# Patient Record
Sex: FEMALE | Race: WHITE
[De-identification: names, ages, dates, MRNs, and addresses within clinical notes are randomized per-mention and may not be internally consistent; named-entity substitution may affect disease eponyms.]

---

## 2019-06-24 ENCOUNTER — HOSPITAL ENCOUNTER (OUTPATIENT)
Dept: HOSPITAL 47 - OR | Age: 44
Discharge: HOME | End: 2019-06-24
Attending: INTERNAL MEDICINE
Payer: COMMERCIAL

## 2019-06-24 VITALS — RESPIRATION RATE: 18 BRPM | TEMPERATURE: 97.1 F

## 2019-06-24 VITALS — SYSTOLIC BLOOD PRESSURE: 143 MMHG | HEART RATE: 64 BPM | DIASTOLIC BLOOD PRESSURE: 85 MMHG

## 2019-06-24 VITALS — BODY MASS INDEX: 33 KG/M2

## 2019-06-24 DIAGNOSIS — Z80.1: ICD-10-CM

## 2019-06-24 DIAGNOSIS — E11.9: ICD-10-CM

## 2019-06-24 DIAGNOSIS — J45.909: ICD-10-CM

## 2019-06-24 DIAGNOSIS — Q99.1: Primary | ICD-10-CM

## 2019-06-24 DIAGNOSIS — Z79.84: ICD-10-CM

## 2019-06-24 DIAGNOSIS — Z88.6: ICD-10-CM

## 2019-06-24 DIAGNOSIS — Z87.891: ICD-10-CM

## 2019-06-24 DIAGNOSIS — Z88.5: ICD-10-CM

## 2019-06-24 DIAGNOSIS — Z80.42: ICD-10-CM

## 2019-06-24 DIAGNOSIS — M19.90: ICD-10-CM

## 2019-06-24 DIAGNOSIS — Z80.41: ICD-10-CM

## 2019-06-24 DIAGNOSIS — Z80.3: ICD-10-CM

## 2019-06-24 DIAGNOSIS — Z83.2: ICD-10-CM

## 2019-06-24 DIAGNOSIS — K21.9: ICD-10-CM

## 2019-06-24 DIAGNOSIS — Z88.0: ICD-10-CM

## 2019-06-24 DIAGNOSIS — Z79.899: ICD-10-CM

## 2019-06-24 DIAGNOSIS — I10: ICD-10-CM

## 2019-06-24 DIAGNOSIS — E78.5: ICD-10-CM

## 2019-06-24 LAB
BASOPHILS # BLD AUTO: 0.1 K/UL (ref 0–0.2)
BASOPHILS NFR BLD AUTO: 1 %
EOSINOPHIL # BLD AUTO: 0.3 K/UL (ref 0–0.7)
EOSINOPHIL NFR BLD AUTO: 3 %
ERYTHROCYTE [DISTWIDTH] IN BLOOD BY AUTOMATED COUNT: 4.14 M/UL (ref 3.8–5.4)
ERYTHROCYTE [DISTWIDTH] IN BLOOD: 14.4 % (ref 11.5–15.5)
GLUCOSE BLD-MCNC: 117 MG/DL (ref 75–99)
HCT VFR BLD AUTO: 36.2 % (ref 34–46)
HGB BLD-MCNC: 12 GM/DL (ref 11.4–16)
LYMPHOCYTES # SPEC AUTO: 3.1 K/UL (ref 1–4.8)
LYMPHOCYTES NFR SPEC AUTO: 26 %
MCH RBC QN AUTO: 29.1 PG (ref 25–35)
MCHC RBC AUTO-ENTMCNC: 33.3 G/DL (ref 31–37)
MCV RBC AUTO: 87.4 FL (ref 80–100)
MONOCYTES # BLD AUTO: 0.7 K/UL (ref 0–1)
MONOCYTES NFR BLD AUTO: 6 %
NEUTROPHILS # BLD AUTO: 7.4 K/UL (ref 1.3–7.7)
NEUTROPHILS NFR BLD AUTO: 62 %
PLATELET # BLD AUTO: 378 K/UL (ref 150–450)
WBC # BLD AUTO: 11.8 K/UL (ref 3.8–10.6)

## 2019-06-24 PROCEDURE — 81025 URINE PREGNANCY TEST: CPT

## 2019-06-24 PROCEDURE — 85025 COMPLETE CBC W/AUTO DIFF WBC: CPT

## 2019-06-24 PROCEDURE — 38222 DX BONE MARROW BX & ASPIR: CPT

## 2019-06-24 NOTE — PCN
PROCEDURE NOTE



PROCEDURE:

Bone marrow aspirate and biopsy.



INDICATION:

Leukocytosis.



After obtaining consent from the patient, the procedure was performed in the endoscopy

suite under general anesthesia performed by Anesthesia Team.  The patient was put in

the left lateral decubitus position.  The right posterior iliac crest was localized,

skin was cleansed with ChloraPrep, all sterile procedures were followed. Two mL of 2%

Xylocaine were used for local anesthetic.  Bone marrow needle was inserted, 15 mL of

aspirate was obtained about 2 cm core biopsy was obtained without any difficulties.

Pressure applied afterwards.  There was negligible blood loss.  Patient tolerated the

procedure very well without any immediate without any immediate complications.





MMODL / IJN: [id number] / Job#: [id number]

## 2019-11-20 ENCOUNTER — HOSPITAL ENCOUNTER (OUTPATIENT)
Dept: HOSPITAL 47 - LABPAT | Age: 44
Discharge: HOME | End: 2019-11-20
Attending: OBSTETRICS & GYNECOLOGY
Payer: COMMERCIAL

## 2019-11-20 DIAGNOSIS — Z01.812: Primary | ICD-10-CM

## 2019-11-20 DIAGNOSIS — N92.0: ICD-10-CM

## 2019-11-20 DIAGNOSIS — Z01.818: ICD-10-CM

## 2019-11-20 LAB
BASOPHILS # BLD AUTO: 0.2 K/UL (ref 0–0.2)
BASOPHILS NFR BLD AUTO: 2 %
EOSINOPHIL # BLD AUTO: 0.3 K/UL (ref 0–0.7)
EOSINOPHIL NFR BLD AUTO: 2 %
ERYTHROCYTE [DISTWIDTH] IN BLOOD BY AUTOMATED COUNT: 4.69 M/UL (ref 3.8–5.4)
ERYTHROCYTE [DISTWIDTH] IN BLOOD: 12.8 % (ref 11.5–15.5)
HCT VFR BLD AUTO: 43.6 % (ref 34–46)
HGB BLD-MCNC: 14.5 GM/DL (ref 11.4–16)
LYMPHOCYTES # SPEC AUTO: 2.9 K/UL (ref 1–4.8)
LYMPHOCYTES NFR SPEC AUTO: 22 %
MCH RBC QN AUTO: 30.8 PG (ref 25–35)
MCHC RBC AUTO-ENTMCNC: 33.2 G/DL (ref 31–37)
MCV RBC AUTO: 92.8 FL (ref 80–100)
MONOCYTES # BLD AUTO: 0.7 K/UL (ref 0–1)
MONOCYTES NFR BLD AUTO: 5 %
NEUTROPHILS # BLD AUTO: 9 K/UL (ref 1.3–7.7)
NEUTROPHILS NFR BLD AUTO: 68 %
PLATELET # BLD AUTO: 433 K/UL (ref 150–450)
WBC # BLD AUTO: 13.2 K/UL (ref 3.8–10.6)

## 2019-11-20 PROCEDURE — 36415 COLL VENOUS BLD VENIPUNCTURE: CPT

## 2019-11-20 PROCEDURE — 93005 ELECTROCARDIOGRAM TRACING: CPT

## 2019-11-20 PROCEDURE — 85025 COMPLETE CBC W/AUTO DIFF WBC: CPT

## 2019-12-10 NOTE — HP
HISTORY AND PHYSICAL



H and P for surgery this upcoming Monday, the .



This is a 44-year-old, white female,  zero, who presented with heavy, long,

clotty menses with clots.  The patient underwent an endometrial biopsy in the office

revealing a benign polyp, along with proliferative endometrial tissue.  After

considering all options, she would like to proceed with hysteroscopy, polypectomy, and

endometrial ablation.  All risks and benefits of the procedure have been discussed in

detail, all questions answered.



PAST MEDICAL HISTORY:

Significant for anemia, arthritis, asthma, diabetes, hyperlipidemia, hypertension,

polycystic ovarian syndrome.



PAST SURGICAL HISTORY:

Significant for appendectomy, colonoscopy, hernia repair, knee surgery, ovarian

surgery, and tonsillectomy.



CURRENT MEDICATIONS:

1. Actos 15 mg tablet daily.

2. Amaryl 4 mg tablet by mouth once daily.

3. Claritin 10 mg daily.

4. Iron 37.5 mg daily.

5. Flonase allergy relief 50 mcg nasal spray, one spray per nostril once daily as

    needed.

6. Glimepiride 4 mg orally daily.

7. Hydrochlorothiazide 25 mg daily.

8. Lipitor 10 mg daily.

9. Lisinopril 20 mg once daily.

10.Medrol 4 mg daily.

11.Metformin 1000 mg tablets twice daily with meals.

12.Norvasc 5 mg tablet once daily.

13.Ranitidine 150 mg oral capsule daily.

14.Vitamin B12 1000 mcg by way of oral drops daily.

15.Zantac 150 mg orally daily.



ALLERGIES:

Allergies include AMOXICILLIN, CODEINE, DILAUDID, MORPHINE, PENICILLINS, PERCOCET,

ULTRACET, VICODIN.  Allergy reactions not listed.



FAMILY HISTORY:

Significant for prostate cancer, ovarian cancer, breast cancer, lung cancer, melanoma,

anemia.



REPRODUCTIVE HISTORY:

Significant for menarche at the age of 18, no pregnancies in her reproductive life.



SOCIAL HISTORY:

The patient is , she currently smokes tobacco daily, she also vapes e-cigarettes

daily, but denies alcohol or other drug use.



PHYSICAL EXAMINATION:

The patient is 5 feet 9 inches, 246 pounds, BMI 36, blood pressure 142/90, afebrile,

pulse 94.

HEENT exam reveals no thyromegaly, no obvious lymphadenopathy, good dentition.

Breasts are bilaterally symmetric to inspection with no nipple discharge, skin changes,

adenopathy, or discernable lesions or masses.

Chest is clear to auscultation in all fields anteriorly and posteriorly.

Cardiovascular exam reveals regular rate.  Normal rhythm. No obvious murmurs, clicks,

or rubs.

Gastrointestinal system, reveals an obese abdominal wall without rigidity or guarding.

No organosplenomegaly, no herniorrhaphy.  Active bowel sounds.  External genitalia are

normal for age, no discharge or inflammatory lesions are noted.

Bladder is nontender, urethra is negative.

Cervix is nulliparous, uterus is anteverted, anteflexed, small, smooth, mobile,

negative adnexa to palpation bilaterally.

Rectal exam reveals good sphincter tone,  no hemorrhoids, no masses, FIT negative

stool.

No inguinal adenopathy is noted.

Neurologic exam reveals good judgment and insight, normal mood and affect.



IMPRESSION:

Menorrhagia, with endometrial biopsy suggestive of benign endometrial polyps.  Patient

wishing surgical palliation.



PLAN:

We will proceed with hysteroscopy, polypectomy, and NovaSure endometrial ablation.  The

risks of surgery including bleeding, infection, perforation or damage to the cervix,

ureters, bladder, rectum, uterus are all discussed.  Risks of anesthesia including

aspiration, nerve damage, or even remotely death are also discussed.  The [employer] pamphlet

on this procedure is given for the patient's review.  I believe all of her questions

and concerns have been addressed and she understands our discussion without

reservation.





MMODL / IJN: [id number] / Job#: [id number]

## 2019-12-16 ENCOUNTER — HOSPITAL ENCOUNTER (OUTPATIENT)
Dept: HOSPITAL 47 - OR | Age: 44
Discharge: HOME | End: 2019-12-16
Attending: OBSTETRICS & GYNECOLOGY
Payer: COMMERCIAL

## 2019-12-16 VITALS — HEART RATE: 88 BPM | RESPIRATION RATE: 17 BRPM | SYSTOLIC BLOOD PRESSURE: 156 MMHG | DIASTOLIC BLOOD PRESSURE: 89 MMHG

## 2019-12-16 VITALS — TEMPERATURE: 96.8 F

## 2019-12-16 VITALS — BODY MASS INDEX: 34.4 KG/M2

## 2019-12-16 DIAGNOSIS — E11.9: ICD-10-CM

## 2019-12-16 DIAGNOSIS — Z88.5: ICD-10-CM

## 2019-12-16 DIAGNOSIS — Z79.84: ICD-10-CM

## 2019-12-16 DIAGNOSIS — E28.2: ICD-10-CM

## 2019-12-16 DIAGNOSIS — I10: ICD-10-CM

## 2019-12-16 DIAGNOSIS — Z79.1: ICD-10-CM

## 2019-12-16 DIAGNOSIS — K21.9: ICD-10-CM

## 2019-12-16 DIAGNOSIS — N84.0: ICD-10-CM

## 2019-12-16 DIAGNOSIS — Z88.1: ICD-10-CM

## 2019-12-16 DIAGNOSIS — F17.210: ICD-10-CM

## 2019-12-16 DIAGNOSIS — Z80.1: ICD-10-CM

## 2019-12-16 DIAGNOSIS — D64.9: ICD-10-CM

## 2019-12-16 DIAGNOSIS — Z80.3: ICD-10-CM

## 2019-12-16 DIAGNOSIS — Z80.8: ICD-10-CM

## 2019-12-16 DIAGNOSIS — Z79.51: ICD-10-CM

## 2019-12-16 DIAGNOSIS — E78.5: ICD-10-CM

## 2019-12-16 DIAGNOSIS — J45.909: ICD-10-CM

## 2019-12-16 DIAGNOSIS — Z88.6: ICD-10-CM

## 2019-12-16 DIAGNOSIS — N92.0: Primary | ICD-10-CM

## 2019-12-16 DIAGNOSIS — Z79.899: ICD-10-CM

## 2019-12-16 DIAGNOSIS — Z80.41: ICD-10-CM

## 2019-12-16 DIAGNOSIS — E66.9: ICD-10-CM

## 2019-12-16 LAB
GLUCOSE BLD-MCNC: 132 MG/DL (ref 75–99)
GLUCOSE BLD-MCNC: 159 MG/DL (ref 75–99)

## 2019-12-16 PROCEDURE — 88305 TISSUE EXAM BY PATHOLOGIST: CPT

## 2019-12-16 PROCEDURE — 58563 HYSTEROSCOPY ABLATION: CPT

## 2019-12-16 PROCEDURE — 81025 URINE PREGNANCY TEST: CPT

## 2019-12-16 NOTE — P.OP
Date of Procedure: 12/16/19


Preoperative Diagnosis: 


Menorrhagia, anemia, endometrial polyps.


Postoperative Diagnosis: 


Same, pathology pending


Procedure(s) Performed: 


D&C, polypectomy, NovaSure endometrial ablation, hysteroscopy


Anesthesia: KATHIA


Surgeon: Chuyita Miranda


Estimated Blood Loss (ml): 5


IV fluids (ml): 100


Urine output (ml): 50


Pathology: other (Endometrial curettings and polyps)


Condition: stable


Disposition: PACU


Operative Findings: 


Endometrial polyps


Description of Procedure: 


Patient is brought to the operating suite where a general anesthetic is 

administered without difficulty.  She's placed in the dorsal lithotomy position.

 The appropriate timeout was performed to assure proper patient and procedural 

identification.  Urine hCG is negative, antibiotics are not deemed necessary.  

The cervix, vagina, perineal bodies are all prepped and draped in usual sterile 

fashion.  Examination under anesthesia reveals a small anteverted uterus, 

negative adnexa bilaterally.  The bladder was drained for 50 mL of clear yellow 

urine.





Weighted speculum was placed into the vagina.  Anterior lip of the cervix is 

grasped with a double-tooth tenaculum.  Cervix sounds to a depth of 9 cm in the 

anteverted position.  Cervix was gently and systematically dilated using Hanks 

dilators.  The hysteroscope was placed and fluid is infused.  The cavity is 

distended.  There are multiple small polyps noted, no obvious fibroids or de

fects.  Hysteroscope was removed.  A medium sharp curette is used and the cavity

is thoroughly and systematically curettaged, curettings and polyps sent to 

pathology for evaluation.  Polyp forcep is used to assure no additional tissue 

is present.





The NovaSure wand is then placed into the cavity and seated properly.  The 

uterine length of 6.5 cm, width of 4.3 cm is calibrated.  The machine is enab

led.  For 32 seconds with a power the 154 W. the procedure is carried out.  When

it is completed, the wand is reduced and removed.  Hysteroscope was once again 

placed and the cavity is noted to be uniformly blanched.





All instrumentation is removed from the vagina.  All sponge needle and 

enhancement counts are correct.  Patient is brought back to the recovery room in

very good condition with stable vital signs including a blood pressure of 

148/97, pulse 100.  Toradol is given prior to leaving the operative suite.  She 

will follow-up with me in the office in 2 weeks.

## 2019-12-31 ENCOUNTER — HOSPITAL ENCOUNTER (OUTPATIENT)
Dept: HOSPITAL 47 - RADMAMWWP | Age: 44
Discharge: HOME | End: 2019-12-31
Attending: OBSTETRICS & GYNECOLOGY
Payer: COMMERCIAL

## 2019-12-31 DIAGNOSIS — Z80.3: ICD-10-CM

## 2019-12-31 DIAGNOSIS — Z12.31: Primary | ICD-10-CM

## 2019-12-31 PROCEDURE — 77067 SCR MAMMO BI INCL CAD: CPT

## 2020-01-02 NOTE — MM
Reason for exam: screening  (asymptomatic).

Last mammogram was performed 1 year and 2 months ago.



History:

Patient is nulliparous.

Family history of breast cancer in maternal aunt and breast cancer in maternal 

cousin.

Took hormonal contraceptives for 6 months.  Took other hormone for 2 years.



Physical Findings:

A clinical breast exam by your physician is recommended on an annual basis and 

results should be correlated with mammographic findings.



MG Screening Mammo w CAD

Bilateral CC and MLO view(s) were taken.

Prior study comparison: October 23, 2018, mammogram, performed at [facility].

The breast tissue is heterogeneously dense. This may lower the sensitivity of 

mammography.  No significant changes when compared with prior studies.





ASSESSMENT: Negative, BI-RAD 1



RECOMMENDATION:

Routine screening mammogram of both breasts in 1 year.

## 2020-11-04 ENCOUNTER — HOSPITAL ENCOUNTER (EMERGENCY)
Dept: HOSPITAL 47 - EC | Age: 45
Discharge: HOME | End: 2020-11-04
Payer: COMMERCIAL

## 2020-11-04 VITALS
RESPIRATION RATE: 20 BRPM | TEMPERATURE: 98.9 F | HEART RATE: 103 BPM | SYSTOLIC BLOOD PRESSURE: 147 MMHG | DIASTOLIC BLOOD PRESSURE: 101 MMHG
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
## 2020-11-04 DIAGNOSIS — Z88.8: ICD-10-CM

## 2020-11-04 DIAGNOSIS — F17.200: ICD-10-CM

## 2020-11-04 DIAGNOSIS — Z88.6: ICD-10-CM

## 2020-11-04 DIAGNOSIS — Z88.5: ICD-10-CM

## 2020-11-04 DIAGNOSIS — S99.922A: Primary | ICD-10-CM
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
## 2020-11-04 DIAGNOSIS — Z88.0: ICD-10-CM

## 2020-11-04 DIAGNOSIS — J45.909: ICD-10-CM

## 2020-11-04 DIAGNOSIS — Z79.51: ICD-10-CM

## 2020-11-04 PROCEDURE — 99283 EMERGENCY DEPT VISIT LOW MDM: CPT

## 2020-11-04 NOTE — ED
Skin/Abscess/FB HPI





- General


Chief complaint: Skin/Abscess/Foreign Body


Stated complaint: toe injury


Time Seen by Provider: 11/04/20 17:22


Source: patient


Mode of arrival: ambulatory


Limitations: no limitations





- History of Present Illness


Initial comments: 





Patient is a 45-year-old female, or diabetes, presenting to emergency Department

with complaints of injury to her left great toe.  Patient states she stubbed her

toe on a door and felt her now,.  Patient is having some mild pain in her great 

toe but mostly around the toenail.  There is no active bleeding, she is not on 

blood thinners.  She denies any other injuries today.  She has no further 

complaints.





- Related Data


                                Home Medications











 Medication  Instructions  Recorded  Confirmed


 


Atorvastatin Calcium [Lipitor] 10 mg PO HS 06/25/14 07/03/20


 


Glimepiride [Amaryl] 4 mg PO AC-SUPPER 06/25/14 07/03/20


 


Ranitidine HCl 150 mg PO QAM PRN 06/25/14 07/03/20


 


amLODIPine BESYLATE [Norvasc] 5 mg PO QAM 06/25/14 07/03/20


 


hydroCHLOROthiazide [Hydrodiuril] 25 mg PO DAILY 06/25/14 07/03/20


 


lisinopriL [Prinivil] 20 mg PO BID 06/25/14 07/03/20


 


metFORMIN HCL 1,000 mg PO BID 06/25/14 07/03/20


 


Ferrous Gluconate 324 mg PO DAILY 06/20/19 07/03/20


 


Fluticasone Nasal Spray [Flonase 1 spray EA NOSTRIL BID 06/20/19 07/03/20





Nasal Spray]   


 


Ibuprofen [Motrin Ib] 400 mg PO Q6H PRN 06/20/19 07/03/20


 


Pioglitazone [Actos] 15 mg PO AC-SUPPER 06/20/19 07/03/20


 


Loratadine [Claritin] 10 mg PO DAILY 10/22/19 07/03/20


 


Ascorbic Acid [Vitamin C] 1,000 mg PO DAILY 11/22/19 07/03/20


 


Calcium/Magnesium/Zinc 1 each PO BID 11/22/19 07/03/20





[Calcium-Magnesium-Zinc Tablet]   


 


Cyanocobalamin (Vitamin B-12) 1,000 mcg PO DAILY 11/22/19 07/03/20





[Vitamin B-12]   


 


Vitamin C/Biotin [Hair, Skin and 1 tab PO BID 11/22/19 07/03/20





Nails]   











                                    Allergies











Allergy/AdvReac Type Severity Reaction Status Date / Time


 


amoxicillin Allergy  Rash/Hives Verified 11/04/20 16:42


 


codeine Allergy  Rash/Hives, Verified 11/04/20 16:42





   Nausea &  





   Vomiting  


 


hydrocodone bitartrate Allergy  Nausea & Verified 11/04/20 16:42





[From Vicodin]   Vomiting,  





   Rash  


 


Morpholine Analogues Allergy  Nausea & Verified 11/04/20 16:42





   Vomiting,  





   Rash  


 


oxycodone HCl [From Percocet] Allergy  Nausea & Verified 11/04/20 16:42





   Vomiting,  





   Rash  


 


tramadol HCl [From Ultracet] Allergy  Dyspnea Verified 11/04/20 16:42














Review of Systems


ROS Statement: 


Those systems with pertinent positive or pertinent negative responses have been 

documented in the HPI.





ROS Other: All systems not noted in ROS Statement are negative.





Past Medical History


Past Medical History: Asthma


Additional Past Medical History / Comment(s): ASTHMA AS CHILD.  HX ANEMIA.  

POLYCYSTIC OVARY.  WBC HAS BEEN ELEVATED FOR FEW YEARS.


History of Any Multi-Drug Resistant Organisms: None Reported


Past Surgical History: Hernia Repair, Orthopedic Surgery


Additional Past Surgical History / Comment(s): SEV HERNIA SURG.  WISDOM TEETH.  

LT KNEE SCOPE.


Past Anesthesia/Blood Transfusion Reactions: Family History of Problems w/ An

esthesia, Motion Sickness, Postoperative Nausea & Vomiting (PONV)


Additional Past Anesthesia/Blood Transfusion Reaction / Comment(s): SEVERE PONV.

 FAMILY HAS PONV.


Past Psychological History: No Psychological Hx Reported


Smoking Status: Current every day smoker


Past Alcohol Use History: Rare


Past Drug Use History: None Reported





- Past Family History


  ** Mother


Family Medical History: Cancer


Additional Family Medical History / Comment(s): SKIN CA, BASAL & SQUAMOUS.





General Exam





- General Exam Comments


Initial Comments: 





GENERAL: 


Patient is well-developed and well-nourished.  Patient is nontoxic and in no 

acute distress.





HEAD: 


Atraumatic, normocephalic.





EYES:


Pupils equal round and reactive to light, extraocular movements intact, sclera 

anicteric, conjunctiva are normal.  Eyelids were unremarkable.





ENT: 


TMs normal, nares patent, oropharynx clear without exudates.  Moist mucous 

membranes.





NECK: 


Normal range of motion, supple without lymphadenopathy or JVD.





LUNGS:


Unlabored respirations.  Breath sounds clear to auscultation bilaterally and 

equal.  No wheezes rales or rhonchi.





HEART:


Regular rate and rhythm without murmurs, rubs or gallops.





ABDOMEN: 


Soft, nontender, normoactive bowel sounds.  No guarding, no rebound.  No masses 

appreciated.





: Deferred 





MUSCULOSKELETAL: 


Normal extremities with adequate strength and normal range of motion, no pitting

or edema.  No clubbing or cyanosis.  No pain to palpation of the left great toe.

 Patient's left great toe nail is lifted up from its nail bed, the distal 

portion is still firmly attached to the nailbed.  There is no active bleeding.





NEUROLOGICAL: 


Patient is alert and oriented x 3.  Normal speech, normal gait.   





PSYCH:


Normal mood, normal affect.





SKIN:


 Warm, Dry, normal turgor, no rashes or lesions noted.


Limitations: no limitations





Course


                                   Vital Signs











  11/04/20





  16:39


 


Temperature 98.9 F


 


Pulse Rate 103 H


 


Respiratory 20





Rate 


 


Blood Pressure 147/101


 


O2 Sat by Pulse 98





Oximetry 














Medical Decision Making





- Medical Decision Making





Patient is a 45-year-old female here with an injury to her left great toenail 

after she stubbed it on a door.  X-rays of the left foot reveal no acute 

fractures dislocations.  Her toenail is lifted up over it is firmly attached at 

the base.  I discussed with patient that I recommend keeping the toenail in 

place for the first few weeks to allow the nail to start to grow out.  She can 

follow-up with her PCP or podiatrist to ultimately have the toenail removed at 

this is problematic.  Patient is in agreement this plan of care.  She is stable 

for discharge.





Disposition


Clinical Impression: 


 Injury of toenail of left foot





Disposition: HOME SELF-CARE


Condition: Stable


Instructions (If sedation given, give patient instructions):  Nail Avulsion (ED)


Additional Instructions: 


Please return to the Emergency Department if symptoms worsen or any other 

concerns.


Recommend keeping a Band-Aid over the left great toenail.  May do warm soaks 

once a day. 


 Follow-up with PCP or podiatrist as discussed.


Is patient prescribed a controlled substance at d/c from ED?: No


Referrals: 


Mayra Og MD [Primary Care Provider] - 1-2 days

## 2020-11-04 NOTE — XR
EXAMINATION TYPE: XR toes LT

 

DATE OF EXAM: 11/4/2020

 

COMPARISON: None

 

HISTORY: Great toe injury, nail ripped off by door

 

TECHNIQUE: Three-view left great toe

 

FINDINGS: No acute fracture or dislocation is evident. The osseous structures are intact. Soft tissue
s appear normal. The nail is dislocated from its normal position

 

IMPRESSION:

1.  Dislocated nail like her left great toe. No acute osseous abnormality is evident.

## 2020-11-16 ENCOUNTER — HOSPITAL ENCOUNTER (OUTPATIENT)
Dept: HOSPITAL 47 - RADCTMAIN | Age: 45
Discharge: HOME | End: 2020-11-16
Attending: INTERNAL MEDICINE
Payer: COMMERCIAL

## 2020-11-16 DIAGNOSIS — N83.8: ICD-10-CM

## 2020-11-16 DIAGNOSIS — E11.9: ICD-10-CM

## 2020-11-16 DIAGNOSIS — K43.9: Primary | ICD-10-CM

## 2020-11-16 LAB — BUN SERPL-SCNC: 16 MG/DL (ref 7–17)

## 2020-11-16 PROCEDURE — 74177 CT ABD & PELVIS W/CONTRAST: CPT

## 2020-11-16 PROCEDURE — 84520 ASSAY OF UREA NITROGEN: CPT

## 2020-11-16 PROCEDURE — 82565 ASSAY OF CREATININE: CPT

## 2020-11-16 PROCEDURE — 36415 COLL VENOUS BLD VENIPUNCTURE: CPT

## 2020-11-16 NOTE — CT
EXAMINATION TYPE: CT abdomen pelvis w con

 

DATE OF EXAM: 11/16/2020

 

COMPARISON: None

 

HISTORY: fatigue, elevated WBC, anemia, epigastric pain

 

CT DLP: 2180.9 mGycm

 

CONTRAST: 

CT scan of the abdomen and pelvis is performed with Oral Contrast and with IV Contrast, patient injec
lilia with 100 mL of Isovue 300.

 

FINDINGS: 

LUNG BASES-: No visible nodule.  No infiltrate. 

 

LIVER/GB:   No calcified gallstones.  No space occupying hepatic lesion. Biliary tree is of normal ca
liber. 

 

PANCREAS:  No inflammation.  No distinct mass. 

 

SPLEEN:  No splenic enlargement.  No lesion seen. 

 

ADRENALS:  No nodule.  No thickening. 

 

KIDNEYS/BLADDER:  No hydronephrosis.  No nephrolithiasis.  No distinct renal mass. Urinary bladder is
 thick-walled and this may be related to poor distention. 

 

BOWEL: Normal appendix.  Normal bowel caliber.  No inflammation.

 

GENITAL ORGANS: 3.8 cm Mixed attenuation lesion right ovary. Further evaluation with ultrasound is re
commended. Left ovary and uterus are unremarkable.

 

LYMPH NODES:  No greater than 1cm abdominal or pelvic lymph nodes are appreciated.

 

AORTA: No significant abnormality. 

 

OSSEOUS STRUCTURES:  No significant abnormality is seen. 

 

OTHER: Ventral fat-containing hernia.

 

IMPRESSION: 

1. Nonspecific right ovarian lesion. Ultrasound recommended for further evaluation.

2. Ventral abdominal wall fat-containing hernia.

## 2021-02-04 ENCOUNTER — HOSPITAL ENCOUNTER (OUTPATIENT)
Dept: HOSPITAL 47 - RADUSWWP | Age: 46
Discharge: HOME | End: 2021-02-04
Attending: FAMILY MEDICINE
Payer: COMMERCIAL

## 2021-02-04 DIAGNOSIS — N83.9: Primary | ICD-10-CM

## 2021-02-04 PROCEDURE — 76856 US EXAM PELVIC COMPLETE: CPT

## 2021-02-04 NOTE — US
EXAMINATION TYPE: US pelvic complete

 

DATE OF EXAM: 2/4/2021

 

COMPARISON: CT 11/16/2020

 

CLINICAL HISTORY: N83.9 Lesion of right Ovary. right ovarian cyst on CT 11/20, no symptoms

 

TECHNIQUE:  TA.  Transabdominal sonographic images of the pelvis were acquired. 

 

Date of LMP:  ablation 2019

 

EXAM MEASUREMENTS:

 

Uterus:  8.3 x 3.0 x 4.3 cm

Endometrial Stripe: 0.5 cm

Right Ovary:  2.8 x 3.1 x 2.0 cm. No suspicious cysts.

Left Ovary:  2.7 x 2.1 x 1.9 cm

 

 

 

1. Uterus:  Anteverted   wnl

2. Endometrium:  wnl

3. Right Ovary:  wnl

4. Left Ovary:  wnl

5. Bilateral Adnexa:  wnl

6. Posterior cul-de-sac:  wnl

 

 

 

IMPRESSION: 

1. Normal pelvic ultrasound

## 2021-02-16 ENCOUNTER — HOSPITAL ENCOUNTER (OUTPATIENT)
Dept: HOSPITAL 47 - ORWHC2ENDO | Age: 46
End: 2021-02-16
Attending: INTERNAL MEDICINE
Payer: COMMERCIAL

## 2021-02-16 VITALS — SYSTOLIC BLOOD PRESSURE: 128 MMHG | RESPIRATION RATE: 18 BRPM | HEART RATE: 85 BPM | DIASTOLIC BLOOD PRESSURE: 84 MMHG

## 2021-02-16 VITALS — TEMPERATURE: 97.8 F

## 2021-02-16 VITALS — BODY MASS INDEX: 37.5 KG/M2

## 2021-02-16 DIAGNOSIS — Z90.49: ICD-10-CM

## 2021-02-16 DIAGNOSIS — D50.9: ICD-10-CM

## 2021-02-16 DIAGNOSIS — F17.210: ICD-10-CM

## 2021-02-16 DIAGNOSIS — K64.8: ICD-10-CM

## 2021-02-16 DIAGNOSIS — K21.00: ICD-10-CM

## 2021-02-16 DIAGNOSIS — Z79.899: ICD-10-CM

## 2021-02-16 DIAGNOSIS — Z99.89: ICD-10-CM

## 2021-02-16 DIAGNOSIS — Z79.4: ICD-10-CM

## 2021-02-16 DIAGNOSIS — Z98.890: ICD-10-CM

## 2021-02-16 DIAGNOSIS — K29.50: ICD-10-CM

## 2021-02-16 DIAGNOSIS — I10: ICD-10-CM

## 2021-02-16 DIAGNOSIS — G47.33: ICD-10-CM

## 2021-02-16 DIAGNOSIS — K62.1: Primary | ICD-10-CM

## 2021-02-16 DIAGNOSIS — Z88.1: ICD-10-CM

## 2021-02-16 DIAGNOSIS — Z88.5: ICD-10-CM

## 2021-02-16 DIAGNOSIS — E11.9: ICD-10-CM

## 2021-02-16 DIAGNOSIS — Z79.1: ICD-10-CM

## 2021-02-16 DIAGNOSIS — Z88.0: ICD-10-CM

## 2021-02-16 DIAGNOSIS — Z91.89: ICD-10-CM

## 2021-02-16 DIAGNOSIS — Z90.89: ICD-10-CM

## 2021-02-16 LAB
GLUCOSE BLD-MCNC: 212 MG/DL (ref 75–99)
GLUCOSE BLD-MCNC: 235 MG/DL (ref 75–99)

## 2021-02-16 PROCEDURE — 81025 URINE PREGNANCY TEST: CPT

## 2021-02-16 PROCEDURE — 43239 EGD BIOPSY SINGLE/MULTIPLE: CPT

## 2021-02-16 PROCEDURE — 45380 COLONOSCOPY AND BIOPSY: CPT

## 2021-02-16 PROCEDURE — 88305 TISSUE EXAM BY PATHOLOGIST: CPT

## 2021-02-16 NOTE — P.PCN
Date of Procedure: 02/16/21


Description of Procedure: 


Brief history:


Patient is a pleasant 45-year-old female presenting for outpatient EGD and 

colonoscopy for evaluation of epigastric abdominal pain and iron deficiency 

anemia.  Patient was long-standing history of epigastric pain and reflux.  He 

also has been followed for anemia and is on iron supplementation.  She has had 

prior EGD and colonoscopy in the past.





Procedure performed:


Esophagogastroduodenoscopy with biopsy


Colonoscopy





Estimated blood loss:


Minimal.





Preoperative diagnosis:


Epigastric abdominal pain, iron deficiency anemia





Anesthesia: 


MAC





Procedure:


After informed consent was obtained from the patient  was brought into the 

endoscopy unit and IV  sedation was administered by anesthesia under continuous 

monitoring.  Initially upper endoscopy was done.  The Olympus  video 

endoscope was inserted into the mouth and esophagus intubated without any 

difficulty and was gradually advanced into the stomach and duodenum and 

carefully examined.  The bulb and second part of the duodenum appeared normal, 

with biopsies taken.  The scope was then withdrawn into the stomach adequately 

insufflated with air and upon careful examination  the antrum and body, cardia 

and fundus appeared normal, except for some mild punctate erythema in the antrum

and body suggestive of mild gastritis biopsies taken.  The scope was then 

withdrawn into the esophagus.  The GE junction was located at 40 cm to the 

incisors and biopsies .  It appeared regular with no erythema erosions or 

ulcerations.  Rest of the esophagus appeared normal.  Patient tolerated the 

procedure well.





At this time the patient continued to remain sedation.  Initial digital rectal 

examination was normal.  Olympus  video colonoscope was then inserted into

the rectum and gradually advanced to the cecum without any difficulty.  Careful 

examination was performed as the scope was gradually being withdrawn.  The prep 

was excellent.  The cecum, ascending colon, transverse colon, descending colon, 

sigmoid colon and rectum appeared normal, with diminutive 1 mm rectal polyp 

removed with cold forcep polypectomy.  Retroflexion was performed in the rectum 

and no lesions were noted and low-grade internal hemorrhoids seen.  Patient 

tolerated the procedure well.





Impression:


1.  Mild gastritis.  Biopsies of the duodenum, antrum and body GE junction.  


2.  Diminutive rectal polyp removed with cold forcep polypectomy.  Low-grade 

internal hemorrhoids.








Recommendations:


Findings of this examination were discussed with the patient as well as her 

family.  Okay to resume diet.  Okay to resume medications.  Await pathology from

biopsies and polypectomy.  Recommend repeat colonoscopy in 7 years pending 

pathology from polypectomy.

## 2021-04-02 ENCOUNTER — HOSPITAL ENCOUNTER (OUTPATIENT)
Dept: HOSPITAL 47 - RADMAMWWP | Age: 46
Discharge: HOME | End: 2021-04-02
Attending: FAMILY MEDICINE
Payer: COMMERCIAL

## 2021-04-02 DIAGNOSIS — Z12.31: Primary | ICD-10-CM

## 2021-04-02 PROCEDURE — 77067 SCR MAMMO BI INCL CAD: CPT

## 2021-04-02 PROCEDURE — 77063 BREAST TOMOSYNTHESIS BI: CPT

## 2021-04-05 NOTE — MM
Reason for exam: screening  (asymptomatic).

Last mammogram was performed 1 year and 3 months ago.



History:

Patient is nulliparous.

Family history of breast cancer in maternal aunt and breast cancer in maternal 

cousin.

Took hormonal contraceptives for 6 months.  Took other hormone for 2 years.



Physical Findings:

A clinical breast exam by your physician is recommended on an annual basis and 

results should be correlated with mammographic findings.



MG 3D Screening Mammo W/Cad

Bilateral CC and MLO view(s) were taken.

Prior study comparison: December 31, 2019, bilateral MG screening mammo w CAD.  

October 23, 2018, mammogram, performed at [facility].

The breast tissue is heterogeneously dense. This may lower the sensitivity of 

mammography.  There is no discrete abnormality.  No significant changes when 

compared with prior studies.





ASSESSMENT: Negative, BI-RAD 1



RECOMMENDATION:

Routine screening mammogram of both breasts in 1 year.

## 2021-07-13 ENCOUNTER — HOSPITAL ENCOUNTER (EMERGENCY)
Dept: HOSPITAL 47 - EC | Age: 46
Discharge: HOME | End: 2021-07-13
Payer: COMMERCIAL

## 2021-07-13 VITALS
RESPIRATION RATE: 16 BRPM | TEMPERATURE: 97.5 F | HEART RATE: 90 BPM | DIASTOLIC BLOOD PRESSURE: 89 MMHG | SYSTOLIC BLOOD PRESSURE: 155 MMHG

## 2021-07-13 DIAGNOSIS — F17.200: ICD-10-CM

## 2021-07-13 DIAGNOSIS — Z99.81: ICD-10-CM

## 2021-07-13 DIAGNOSIS — K21.9: ICD-10-CM
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
## 2021-07-13 DIAGNOSIS — I10: ICD-10-CM

## 2021-07-13 DIAGNOSIS — E11.9: ICD-10-CM

## 2021-07-13 DIAGNOSIS — J45.909: ICD-10-CM

## 2021-07-13 DIAGNOSIS — G47.33: ICD-10-CM

## 2021-07-13 DIAGNOSIS — S01.511A: Primary | ICD-10-CM

## 2021-07-13 PROCEDURE — 99285 EMERGENCY DEPT VISIT HI MDM: CPT

## 2021-07-13 PROCEDURE — 96361 HYDRATE IV INFUSION ADD-ON: CPT

## 2021-07-13 PROCEDURE — 99282 EMERGENCY DEPT VISIT SF MDM: CPT

## 2021-07-13 PROCEDURE — 96372 THER/PROPH/DIAG INJ SC/IM: CPT

## 2021-07-13 PROCEDURE — 96360 HYDRATION IV INFUSION INIT: CPT

## 2021-07-13 PROCEDURE — 12011 RPR F/E/E/N/L/M 2.5 CM/<: CPT

## 2021-07-13 NOTE — ED
Wound/Laceration HPI





- General


Chief Complaint: Wound/Laceration


Stated Complaint: Lip Lac


Time Seen by Provider: 07/13/21 19:03


Source: patient


Mode of arrival: ambulatory


Limitations: no limitations





- History of Present Illness


Initial Comments: 


45 year-old male patient presents to the emergency department today for 

evaluation of facial injury. States around 4:00 this afternoon she was pulling 

fence poles out of the ground when the chain broke on the pole puller and came 

back and struck her in the face. States that she has a laceration on the inside 

of her upper lip that won't stop bleeding. She denies any loss of consciousness 

with the injury. Denies any injury to the teeth. Denies headache or neck pain. 

Denies any other injuries. States she is unsure when her last tetanus vaccine 

was given. 








- Related Data


                                Home Medications











 Medication  Instructions  Recorded  Confirmed


 


Atorvastatin Calcium [Lipitor] 10 mg PO HS 06/25/14 02/16/21


 


amLODIPine BESYLATE [Norvasc] 5 mg PO QAM 06/25/14 02/16/21


 


hydroCHLOROthiazide [Hydrodiuril] 25 mg PO DAILY 06/25/14 02/16/21


 


lisinopriL [Prinivil] 20 mg PO BID 06/25/14 02/16/21


 


metFORMIN HCL 1,000 mg PO BID 06/25/14 02/16/21


 


Ferrous Gluconate 324 mg PO DAILY 06/20/19 02/16/21


 


Fluticasone Nasal Spray [Flonase 1 spray EA NOSTRIL BID 06/20/19 02/16/21





Nasal Spray]   


 


Ibuprofen [Motrin Ib] 400 mg PO Q6H PRN 06/20/19 02/16/21


 


Pioglitazone [Actos] 15 mg PO AC-SUPPER 06/20/19 02/16/21


 


Loratadine [Claritin] 10 mg PO DAILY 10/22/19 02/16/21


 


Ascorbic Acid [Vitamin C] 1,000 mg PO DAILY 11/22/19 02/16/21


 


Calcium/Magnesium/Zinc 1 each PO BID 11/22/19 02/16/21





[Calcium-Magnesium-Zinc Tablet]   


 


Cyanocobalamin (Vitamin B-12) 1,000 - 2,000 mcg PO DAILY 11/22/19 02/16/21





[Vitamin B-12]   


 


Vitamin C/Biotin [Hair, Skin and 1 tab PO BID 11/22/19 02/16/21





Nails]   


 


Ergocalciferol (Vitamin D2) 1,250 mcg PO Q14D 02/12/21 02/16/21





[Vitamin D2 (50,000 Iu)]   


 


Insulin Glargine,Hum.rec.anlog 10 unit SQ BID 02/12/21 02/16/21





[Lantus Solostar]   











                                    Allergies











Allergy/AdvReac Type Severity Reaction Status Date / Time


 


amoxicillin Allergy  Rash/Hives Verified 07/13/21 18:34


 


cephalexin [From Keflex] Allergy  Rash/Hives Verified 07/13/21 18:34


 


clavulanic acid Allergy  Rash/Hives Verified 07/13/21 18:34





[From Augmentin]     


 


codeine Allergy  Rash/Hives, Verified 07/13/21 18:34





   Nausea &  





   Vomiting  


 


hydrocodone bitartrate Allergy  Nausea & Verified 07/13/21 18:34





[From Vicodin]   Vomiting,  





   Rash  


 


morphine Allergy  Nausea & Verified 07/13/21 18:34





   Vomiting  


 


oxycodone HCl [From Percocet] Allergy  Nausea & Verified 07/13/21 18:34





   Vomiting,  





   Rash  


 


tramadol HCl [From Ultracet] Allergy  Dyspnea Verified 07/13/21 18:34














Review of Systems


ROS Statement: 


Those systems with pertinent positive or pertinent negative responses have been 

documented in the HPI.





ROS Other: All systems not noted in ROS Statement are negative.





Past Medical History


Past Medical History: Asthma, Diabetes Mellitus, GERD/Reflux, Hypertension, 

Sleep Apnea/CPAP/BIPAP


Additional Past Medical History / Comment(s): ASTHMA AS CHILD.  HX ANEMIA.  

POLYCYSTIC OVARY.  MIGRAINE HEADACHE, HISTORY OF WBC BEING ELEVATED,


History of Any Multi-Drug Resistant Organisms: None Reported


Past Surgical History: Appendectomy, Hernia Repair, Orthopedic Surgery, 

Tonsillectomy, Uterine Ablation


Additional Past Surgical History / Comment(s): SEVRAL  HERNIA SURG.  WISDOM 

TEETH EXTRACTED,   LT KNEE ARTHROSCOPY


Past Anesthesia/Blood Transfusion Reactions: Family History of Problems w/ 

Anesthesia, Motion Sickness, Postoperative Nausea & Vomiting (PONV)


Additional Past Anesthesia/Blood Transfusion Reaction / Comment(s): SEVERE PONV.

 FAMILY HAS PONV.


Past Psychological History: No Psychological Hx Reported


Smoking Status: Current every day smoker


Past Alcohol Use History: None Reported


Past Drug Use History: None Reported





- Past Family History


  ** Mother


Family Medical History: Cancer


Additional Family Medical History / Comment(s): SKIN CA, BASAL & SQUAMOUS.





General Exam


Limitations: no limitations


General appearance: alert, in no apparent distress, other (This is a well-

developed, well-nourished adult female patient in no acute distress.  Vital 

signs upon presentation are temperature 97.5F, pulse 90, respirations 16, blood

pressure 155/89, pulse ox 98% on room air.)


Head exam: Present: atraumatic, normocephalic, normal inspection


Eye exam: Present: normal appearance, PERRL, EOMI.  Absent: scleral icterus, 

conjunctival injection, periorbital swelling


ENT exam: Present: normal oropharynx, mucous membranes moist, other (There 3 

tiny lacerations noted to the external upper lip.  There is 2 cm laceration 

noted to the mucosal surface of the upper lip in the center. Mild bleeding. No 

dental injury. )


Neck exam: Present: normal inspection, full ROM, other (Nontender, no step-off, 

no deformity to firm midline palpation of the posterior cervical spine.  Full 

range of motion without pain or limitation.).  Absent: tenderness, meningismus, 

lymphadenopathy


Respiratory exam: Present: normal lung sounds bilaterally.  Absent: respiratory 

distress, wheezes, rales, rhonchi, stridor


Cardiovascular Exam: Present: regular rate, normal rhythm, normal heart sounds. 

Absent: systolic murmur, diastolic murmur, rubs, gallop, clicks


Neurological exam: Present: alert, oriented X3, CN II-XII intact


Psychiatric exam: Present: normal affect, normal mood


Skin exam: Present: warm, dry, intact, normal color.  Absent: rash





Course


                                   Vital Signs











  07/13/21





  18:29


 


Temperature 97.5 F L


 


Pulse Rate 90


 


Respiratory 16





Rate 


 


Blood Pressure 155/89


 


O2 Sat by Pulse 98





Oximetry 














- Reevaluation(s)


Reevaluation #1: 





07/13/21 19:25





Refused Tetanus vaccine.





Procedures





- Laceration


  ** Laceration #1


Consent Obtained: verbal consent


Indication: laceration


Site: lip


Size (cm): 2


Description: linear


Depth: through-and-through


Anesthetic Used: lidocaine 1%


Anesthesia Technique: local infiltration


Amount (mls): 2


Pre-repair: irrigated extensively


Type of Sutures: vicryl


Size of Sutures: 5-0


Number of Sutures: 2


Technique: simple, interrupted


Patient Tolerated Procedure: well, no complications





Medical Decision Making





- Medical Decision Making


45-year-old female patient presents for evaluation of upper lip injury.  

Physical examination did reveal 3 tiny lacerations noted to the central upper 

lip externally.  She got 2 cm internal lip laceration.  Did have some bleeding. 

This was cleansed, 2 Vicryl sutures were placed.  No evidence for dental injury.

 She had no head injury.  She'll be discharged to follow up with her primary 

care physician for recheck in 1-2 days.  Return parameters were discussed in 

detail.  She verbalizes understanding and agrees with this plan.  Case discussed

my attending Dr. Majano.








Disposition


Clinical Impression: 


 Lip laceration





Disposition: HOME SELF-CARE


Condition: Good


Instructions (If sedation given, give patient instructions):  Laceration (ED)


Additional Instructions: 


Keep wounds clean and dry. The stitches dissolve on their own, can take up to 60

days. Follow up with the physician for recheck in 1-2 days.  Return for any new,

worsening, or concerning symptoms.


Is patient prescribed a controlled substance at d/c from ED?: No


Referrals: 


Mayra Og MD [Primary Care Provider] - 1-2 days


Time of Disposition: 19:56

## 2023-11-08 ENCOUNTER — HOSPITAL ENCOUNTER (EMERGENCY)
Dept: HOSPITAL 47 - EC | Age: 48
Discharge: HOME | End: 2023-11-08
Payer: COMMERCIAL

## 2023-11-08 VITALS — DIASTOLIC BLOOD PRESSURE: 114 MMHG | HEART RATE: 88 BPM | SYSTOLIC BLOOD PRESSURE: 170 MMHG

## 2023-11-08 VITALS — RESPIRATION RATE: 18 BRPM | TEMPERATURE: 98.4 F
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
## 2023-11-08 DIAGNOSIS — I10: ICD-10-CM

## 2023-11-08 DIAGNOSIS — S61.210A: Primary | ICD-10-CM

## 2023-11-08 DIAGNOSIS — Z88.5: ICD-10-CM

## 2023-11-08 DIAGNOSIS — J45.909: ICD-10-CM

## 2023-11-08 DIAGNOSIS — G47.30: ICD-10-CM

## 2023-11-08 DIAGNOSIS — Z88.6: ICD-10-CM

## 2023-11-08 DIAGNOSIS — Z23: ICD-10-CM

## 2023-11-08 DIAGNOSIS — Z79.84: ICD-10-CM

## 2023-11-08 DIAGNOSIS — F17.200: ICD-10-CM

## 2023-11-08 DIAGNOSIS — Z88.1: ICD-10-CM

## 2023-11-08 DIAGNOSIS — Z88.0: ICD-10-CM

## 2023-11-08 DIAGNOSIS — Z88.8: ICD-10-CM

## 2023-11-08 DIAGNOSIS — E11.9: ICD-10-CM

## 2023-11-08 DIAGNOSIS — Z79.4: ICD-10-CM

## 2023-11-08 DIAGNOSIS — Z79.899: ICD-10-CM

## 2023-11-08 PROCEDURE — 12001 RPR S/N/AX/GEN/TRNK 2.5CM/<: CPT

## 2023-11-08 PROCEDURE — 90471 IMMUNIZATION ADMIN: CPT

## 2023-11-08 PROCEDURE — 90715 TDAP VACCINE 7 YRS/> IM: CPT

## 2023-11-08 PROCEDURE — 99282 EMERGENCY DEPT VISIT SF MDM: CPT

## 2023-11-08 NOTE — ED
General Adult HPI





- General


Stated complaint: right index finger cut


Time Seen by Provider: 11/08/23 19:44


Source: patient, RN notes reviewed





- History of Present Illness


Initial comments: 





48 year old female presents to the emergency department for chief complaint of 

right index finger laceration. She states this occurred around 5pm today. She 

reports that she cut her finger with a can. She states that it continues to 

bleed. Her last tetanus vaccination was >10 years ago. 





- Related Data


                                Home Medications











 Medication  Instructions  Recorded  Confirmed


 


Atorvastatin Calcium [Lipitor] 10 mg PO HS 06/25/14 02/16/21


 


amLODIPine BESYLATE [Norvasc] 5 mg PO QAM 06/25/14 02/16/21


 


hydroCHLOROthiazide [Hydrodiuril] 25 mg PO DAILY 06/25/14 02/16/21


 


lisinopriL [Prinivil] 20 mg PO BID 06/25/14 02/16/21


 


metFORMIN HCL [Glucophage] 1,000 mg PO BID 06/25/14 02/16/21


 


Ferrous Gluconate 324 mg PO DAILY 06/20/19 02/16/21


 


Fluticasone Nasal Spray [Flonase 1 spray EA NOSTRIL BID 06/20/19 02/16/21





Nasal Spray]   


 


Ibuprofen [Motrin Ib] 400 mg PO Q6H PRN 06/20/19 02/16/21


 


Pioglitazone [Actos] 15 mg PO AC-SUPPER 06/20/19 02/16/21


 


Loratadine [Claritin] 10 mg PO DAILY 10/22/19 02/16/21


 


Ascorbic Acid [Vitamin C] 1,000 mg PO DAILY 11/22/19 02/16/21


 


Calcium/Magnesium/Zinc 1 each PO BID 11/22/19 02/16/21





[Calcium-Magnesium-Zinc Tablet]   


 


Cyanocobalamin (Vitamin B-12) 1,000 - 2,000 mcg PO DAILY 11/22/19 02/16/21





[Vitamin B-12]   


 


Vitamin C/Biotin [Hair, Skin and 1 tab PO BID 11/22/19 02/16/21





Nails]   


 


Ergocalciferol (Vitamin D2) 1,250 mcg PO Q14D 02/12/21 02/16/21





[Vitamin D2 (50,000 Iu)]   


 


Insulin Glargine,Hum.rec.anlog 10 unit SQ BID 02/12/21 02/16/21





[Lantus Solostar]   











                                    Allergies











Allergy/AdvReac Type Severity Reaction Status Date / Time


 


amoxicillin Allergy  Rash/Hives Verified 11/08/23 20:02


 


cephalexin [From Keflex] Allergy  Rash/Hives Verified 11/08/23 20:02


 


clavulanic acid Allergy  Rash/Hives Verified 11/08/23 20:02





[From Augmentin]     


 


codeine Allergy  Rash/Hives, Verified 11/08/23 20:02





   Nausea &  





   Vomiting  


 


hydrocodone bitartrate Allergy  Nausea & Verified 11/08/23 20:02





[From Vicodin]   Vomiting,  





   Rash  


 


morphine Allergy  Nausea & Verified 11/08/23 20:02





   Vomiting  


 


oxycodone HCl [From Percocet] Allergy  Nausea & Verified 11/08/23 20:02





   Vomiting,  





   Rash  


 


tramadol HCl [From Ultracet] Allergy  Dyspnea Verified 11/08/23 20:02














Review of Systems


ROS Statement: 


Those systems with pertinent positive or pertinent negative responses have been 

documented in the HPI.





ROS Other: All systems not noted in ROS Statement are negative.





Past Medical History


Past Medical History: Asthma, Diabetes Mellitus, GERD/Reflux, Hypertension, 

Sleep Apnea/CPAP/BIPAP


Additional Past Medical History / Comment(s): ASTHMA AS CHILD.  HX ANEMIA.  

POLYCYSTIC OVARY.  MIGRAINE HEADACHE, HISTORY OF WBC BEING ELEVATED,


History of Any Multi-Drug Resistant Organisms: None Reported


Past Surgical History: Appendectomy, Hernia Repair, Orthopedic Surgery, 

Tonsillectomy, Uterine Ablation


Additional Past Surgical History / Comment(s): SEVRAL  HERNIA SURG.  WISDOM 

TEETH EXTRACTED,   LT KNEE ARTHROSCOPY


Past Anesthesia/Blood Transfusion Reactions: Family History of Problems w/ 

Anesthesia, Motion Sickness, Postoperative Nausea & Vomiting (PONV)


Additional Past Anesthesia/Blood Transfusion Reaction / Comment(s): SEVERE PONV.

 FAMILY HAS PONV.


Past Psychological History: No Psychological Hx Reported


Smoking Status: Current every day smoker


Past Alcohol Use History: None Reported


Past Drug Use History: None Reported





- Past Family History


  ** Mother


Family Medical History: Cancer


Additional Family Medical History / Comment(s): SKIN CA, BASAL & SQUAMOUS.





General Exam





- General Exam Comments


Initial Comments: 





Visual Physical Exam





Vital signs reviewed





General: Well-appearing, nontoxic, no acute distress.


Head: Normocephalic, atraumatic


Eyes: PERRLA, EOMI


ENT: Airway patent


Chest: Nonlabored breathing


Skin: No visual rash, normal skin tone


Neuro: Alert and oriented 3


Musculoskeletal: No gross abnormalities


Limitations: no limitations


General appearance: alert, in no apparent distress


Head exam: Present: atraumatic, normocephalic, normal inspection


Eye exam: Present: normal appearance, PERRL, EOMI.  Absent: scleral icterus, 

conjunctival injection, periorbital swelling


Extremities exam: Present: normal inspection, full ROM, normal capillary refill,

other (Laceration 0.5 cm to right index finger lateral to the fingernail).  

Absent: tenderness


Psychiatric exam: Present: normal affect, normal mood


Skin exam: Present: warm, dry, normal color.  Absent: intact (Laceration 0.5 cm 

to right index finger lateral to the fingernail)





Course


                                   Vital Signs











  11/08/23 11/08/23





  19:57 20:32


 


Temperature 98.4 F 


 


Pulse Rate 80 88


 


Respiratory 18 18





Rate  


 


Blood Pressure 174/124 170/114


 


O2 Sat by Pulse 99 





Oximetry  














Medical Decision Making





- Medical Decision Making


I preformed the quick note portion of this chart. Electronically signed by 

Ayala Baldwin PA-C.


Was pt. sent in by a medical professional or institution (FRANCK Killian, NP, urgent 

care, hospital, or nursing home...) When possible be specific


@  -No


Did you speak to anyone other than the patient for history (EMS, parent, family,

police, friend...)? What history was obtained from this source 


@  -No


Did you review nursing and triage notes (agree or disagree)?  Why? 


@  -I reviewed and agree with nursing and triage notes


Were old charts reviewed (outside hosp., previous admission, EMS record, old 

EKG, old radiological studies, urgent care reports/EKG's, nursing home records)?

Report findings 


@  -No old charts were reviewed


Differential Diagnosis (chest pain, altered mental status, abdominal pain women,

abdominal pain men, vaginal bleeding, weakness, fever, dyspnea, syncope, 

headache, dizziness, GI bleed, back pain, seizure, CVA, palpatations, mental 

health, musculoskeletal)? 


@  -Differential Musculoskeletal


Muscular strain, contusion, ligament sprain, fracture, arthritis, septic 

arthritis, bursitis, cellulitis, muscle spasm, nerve compression, DVT, arterial 

occlusion, herpes zoster, electrolyte abnormality, tumor.... This is not meant 

to be in all inclusive list


EKG interpreted by me (3pts min.).


@  -none


X-rays interpreted by me (1pt min.).


@  -None done


CT interpreted by me (1pt min.).


@  -None done


U/S interpreted by me (1pt. min.).


@  -None done


What testing was considered but not performed or refused? (CT, X-rays, U/S, 

labs)? Why?


@  -None


What meds were considered but not given or refused? Why?


@  -None


Did you discuss the management of the patient with other professionals 

(professionals i.e. , PA, NP, lab, RT, psych nurse, , , 

teacher, , )? Give summary


@  -No


Was smoking cessation discussed for >3mins.?


@  -No


Was critical care preformed (if so, how long)?


@  -No


Were there social determinants of health that impacted care today? How? 

(Homelessness, low income, unemployed, alcoholism, drug addiction, diaz

sportation, low edu. Level, literacy, decrease access to med. care, [facility], 

rehab)?


@  -No


Was there de-escalation of care discussed even if they declined (Discuss DNR or 

withdrawal of care, Hospice)? DNR status


@  -No


What co-morbidities impacted this encounter? (DM, HTN, Smoking, COPD, CAD, 

Cancer, CVA, ARF, Chemo, Hep., AIDS, mental health diagnosis, sleep apnea, 

morbid obesity)?


@  -None


Was patient admitted / discharged? Hospital course, mention meds given and 

route, prescriptions, significant lab abnormalities, going to OR and other 

pertinent info.


@  -Discharged.   Patient presented to emergency department chief complaint of 

right index finger laceration on a can.  Patient's tetanus vaccination was 

updated.  Wound was cleaned and exofin applied.  Patient is found to be 

hypertensive in the emergency department.  Patient has a history of hypertension

and has not taken her nighttime medications at this time. Patient stable at time

of discharge. Case discussed with Dr. Forte   


Undiagnosed new problem with uncertain prognosis?


@  -No


Drug Therapy requiring intensive monitoring for toxicity (Heparin, Nitro, 

Insulin, Cardizem)?


@  -No


Were any procedures done?


@  -No


Diagnosis/symptom?


@  -laceration


Acute, or Chronic, or Acute on Chronic?


@  -acute


Uncomplicated (without systemic symptoms) or Complicated (systemic symptoms)?


@  -uncomplicated


Side effects of treatment?


@  -[o]Exacerbation, Progression, or Severe Exacerbation?


@  -[o]Poses a threat to life or bodily function? How? (Chest pain, USA, MI, 

pneumonia, PE, COPD, DKA, ARF, appy, cholecystitis, CVA, Diverticulitis, 

Homicidal, Suicidal, threat to staff... and all critical care pts)


@  -[o]





Disposition


Clinical Impression: 


 Laceration





Disposition: HOME SELF-CARE


Condition: Stable


Instructions (If sedation given, give patient instructions):  Skin Adhesive Care

 (ED)


Additional Instructions: 


Please keep wound clean and dry. Follow up with your primary care provider. 

Return to the emergency department for new or worsening symptoms. 


Is patient prescribed a controlled substance at d/c from ED?: No


Referrals: 


Mayra Og MD [Primary Care Provider] - 1-2 days